# Patient Record
Sex: FEMALE | Race: WHITE
[De-identification: names, ages, dates, MRNs, and addresses within clinical notes are randomized per-mention and may not be internally consistent; named-entity substitution may affect disease eponyms.]

---

## 2018-10-07 ENCOUNTER — HOSPITAL ENCOUNTER (EMERGENCY)
Dept: HOSPITAL 89 - ER | Age: 33
Discharge: HOME | End: 2018-10-07
Payer: COMMERCIAL

## 2018-10-07 VITALS — SYSTOLIC BLOOD PRESSURE: 113 MMHG | DIASTOLIC BLOOD PRESSURE: 74 MMHG

## 2018-10-07 DIAGNOSIS — S90.32XA: Primary | ICD-10-CM

## 2018-10-07 PROCEDURE — 99283 EMERGENCY DEPT VISIT LOW MDM: CPT

## 2018-10-07 NOTE — ER REPORT
History and Physical


Time Seen By MD:  18:28


HPI/ROS


CHIEF COMPLAINT: Left foot injury





HISTORY OF PRESENT ILLNESS: Patient was walking and dropped a box on her left 


foot. She complaining of pain to the dorsum of the left foot specifically along 


the 1st metatarsal area. Painful weightbearing. Denies any other prior injuries.


Denies any ankle pain or proximal fibular pain


Allergies:  


Uncoded Allergies:  


     ENVIRONMENTAL (Allergy, Mild, 08)


Home Meds


Active Scripts


Naproxen (NAPROXEN) 375 Mg Tablet, 375 MG PO TID for PAIN, #15 TAB 0 Refills


   Prov:NIC SHEFFIELD MD         10/7/18


Reported Medications


Omeprazole (OMEPRAZOLE) 20 Mg Capsule.dr, 1 CAP PO PRN, CAP


   10/7/18


Fexofenadine Hcl/Pseudoephedr (ALLEGRA-D 24 HOUR TABLET) 1 Each Tabsr, 1 TAB PO 


QDAY


   10/7/18


Norgestimate-Ethinyl Estradiol (Ortho-Cyclen) 1 Tab Tablet, 1 TAB PO, #1 0 


Refills


   DAILY


   08


Discontinued Reported Medications


Alprazolam (Xanax Er) 0.5 Mg Tab.sr.24h, 0.5 MG PO


   1 EVERY 8 HOURS AS NEEDED FOR ANXIETY OR PALPITATIONS


   08


Past Medical/Surgical History


Noncontributory


Constitutional





Vital Sign - Last 24 Hours








 10/7/18 10/7/18 10/7/18 10/7/18





 18:29 18:31 18:41 18:56


 


Temp  98.2  


 


Pulse  82 79 68


 


Resp  20  


 


B/P (MAP) 112/71 (85) 112/71  


 


Pulse Ox  97 96 96


 


O2 Delivery  Room Air  


 


    





 10/7/18 10/7/18 10/7/18 10/7/18





 19:11 19:16 19:31 19:46


 


Pulse 68 72 73 75


 


Pulse Ox 97 96 97 97





 10/7/18   





 20:01   


 


Pulse 62   


 


B/P (MAP) 113/74 (87)   


 


Pulse Ox 97   








Physical Exam


 General appearance: alert no distress





Left ankle:  There is no significant swelling.  There is no obvious deformity to


the ankle.  There is moderate tenderness to the lateral malleolus.  Ankle joint 


is stable and there is no tenderness over the achilles tendon.


Left foot has some tenderness along the 1st metatarsal area without significant 


deformity no obvious ecchymosis or erythema.


Neurologic exam: The patient has normal sensation distal to the injury.


Vascular exam: Normal pulses and capillary refill in the foot


[ ]





DIFFERENTIAL DIAGNOSIS: After history and physical exam differential diagnosis 


was considered for  ankle injury including sprain, fracture, dislocation and 


soft tissue injury. Foot fracture, foot sprain, foot contusion





Medical Decision Making


EKG/Imaging


Imaging


FACILITY: [facility] - [place] 


 


PATIENT NAME: Suly Fontenot


: 1985


MR: [id number]


V: [id number]


EXAM DATE: [id number]


ORDERING PHYSICIAN: NIC SHEFFIELD


TECHNOLOGIST: 


 


[place] [facility] [place]


Patient: Suly Fontenot


: 1985


MRN: [id number]


Visit/Account:[id number]


Date of Sevice: 10/07/2018


 


ACCESSION #: [id number]


 


EXAMINATION:  Left foot, 3 views  10/7/2018 6:54 PM


 


HISTORY: trauma; pain along 1st metatarsal


 


COMPARISON:  None


 


FINDINGS:  Visualized bony structures are intact and anatomically aligned 


without fracture or other acute osseous abnormality evident. Incidental 


sesamoids along the first IP joint.


 


IMPRESSION:


No acute bony abnormality.


 


Report Dictated By: Toni Dumont MD at 10/7/2018 7:24 PM


 


Report E-Signed By: Toni Dumont MD  at 10/7/2018 7:26 PM


 


[id number]





ED Course/Re-evaluation


ED Course


Plan at this time will be to x-ray the left foot.


Decision to Disposition Date:  Oct 7, 2018


Decision to Disposition Time:  19:57





Depart


Departure


Latest Vital Signs





Vital Signs








  Date Time  Temp Pulse Resp B/P (MAP) Pulse Ox O2 Delivery O2 Flow Rate FiO2


 


10/7/18 20:01  62  113/74 (87) 97   


 


10/7/18 18:31 98.2  20   Room Air  








Impression:  


   Primary Impression:  


   Foot contusion


Condition:  Improved


Disposition:  HOME OR SELF-CARE


New Scripts


Naproxen (NAPROXEN) 375 Mg Tablet


375 MG PO TID for PAIN, #15 TAB 0 Refills


   Prov: NIC SHEFFIELD MD         10/7/18


Patient Instructions:  Contusion in Adults (ED)





Problem Qualifiers








   Primary Impression:  


   Foot contusion


   Encounter type:  initial encounter  Laterality:  left  Qualified Codes:  S


   90.32XA - Contusion of left foot, initial encounter








NIC SHEFFIELD MD              Oct 7, 2018 18:28

## 2018-10-07 NOTE — RADIOLOGY IMAGING REPORT
FACILITY: [facility] - [place] 

 

PATIENT NAME: Suly Fontenot

: 1985

MR: [id number]

V: [id number]

EXAM DATE: 

ORDERING PHYSICIAN: NIC SHEFFIELD

TECHNOLOGIST: 

 

Location: [facility] - [place]

Patient: Suly Fontenot

: 1985

MRN: [id number]

Visit/Account:[id number]

Date of Sevice: 10/07/2018

 

ACCESSION #: [id number]

 

EXAMINATION:  Left foot, 3 views  10/7/2018 6:54 PM

 

HISTORY: trauma; pain along 1st metatarsal

 

COMPARISON:  None

 

FINDINGS:  Visualized bony structures are intact and anatomically aligned without fracture or other a
cute osseous abnormality evident. Incidental sesamoids along the first IP joint.

 

IMPRESSION:

No acute bony abnormality.

 

Report Dictated By: Toni Dumont MD at 10/7/2018 7:24 PM

 

Report E-Signed By: Toni Dumont MD  at 10/7/2018 7:26 PM

 

WSN:[id number]